# Patient Record
Sex: FEMALE | Race: WHITE
[De-identification: names, ages, dates, MRNs, and addresses within clinical notes are randomized per-mention and may not be internally consistent; named-entity substitution may affect disease eponyms.]

---

## 2020-01-03 ENCOUNTER — HOSPITAL ENCOUNTER (EMERGENCY)
Dept: HOSPITAL 26 - MED | Age: 25
Discharge: LEFT BEFORE DISCHARGE | End: 2020-01-03
Payer: COMMERCIAL

## 2020-01-03 VITALS — BODY MASS INDEX: 24.59 KG/M2 | HEIGHT: 64 IN | WEIGHT: 144 LBS

## 2020-01-03 VITALS — DIASTOLIC BLOOD PRESSURE: 98 MMHG | SYSTOLIC BLOOD PRESSURE: 136 MMHG

## 2020-01-03 DIAGNOSIS — F15.90: ICD-10-CM

## 2020-01-03 DIAGNOSIS — R68.83: ICD-10-CM

## 2020-01-03 DIAGNOSIS — R11.10: Primary | ICD-10-CM

## 2020-01-03 DIAGNOSIS — Z53.21: ICD-10-CM

## 2020-01-03 NOTE — NUR
PT CALLED IN LOBBY AND OUTSIDE WITH NO ANSWER. PATIENT LEFT WITHOUT BEING SEEN 
BY DR. BAHENA. NO FURTHER CARE PROVIDED FOR PATIENT.